# Patient Record
Sex: MALE | Race: BLACK OR AFRICAN AMERICAN | Employment: FULL TIME | URBAN - METROPOLITAN AREA
[De-identification: names, ages, dates, MRNs, and addresses within clinical notes are randomized per-mention and may not be internally consistent; named-entity substitution may affect disease eponyms.]

---

## 2023-03-28 ENCOUNTER — OFFICE VISIT (OUTPATIENT)
Dept: FAMILY MEDICINE | Facility: CLINIC | Age: 48
End: 2023-03-28
Payer: COMMERCIAL

## 2023-03-28 VITALS
RESPIRATION RATE: 24 BRPM | TEMPERATURE: 97.9 F | BODY MASS INDEX: 21.77 KG/M2 | HEART RATE: 60 BPM | SYSTOLIC BLOOD PRESSURE: 135 MMHG | HEIGHT: 71 IN | OXYGEN SATURATION: 100 % | DIASTOLIC BLOOD PRESSURE: 84 MMHG | WEIGHT: 155.5 LBS

## 2023-03-28 DIAGNOSIS — Z12.11 SCREEN FOR COLON CANCER: ICD-10-CM

## 2023-03-28 DIAGNOSIS — K62.5 BRBPR (BRIGHT RED BLOOD PER RECTUM): ICD-10-CM

## 2023-03-28 DIAGNOSIS — Z00.00 HEALTHCARE MAINTENANCE: Primary | ICD-10-CM

## 2023-03-28 DIAGNOSIS — F17.210 CIGARETTE NICOTINE DEPENDENCE WITHOUT COMPLICATION: ICD-10-CM

## 2023-03-28 LAB
CHOLEST SERPL-MCNC: 163 MG/DL
GLUCOSE BLD-MCNC: 74 MG/DL (ref 60–99)
HDLC SERPL-MCNC: 44 MG/DL
LDLC SERPL CALC-MCNC: 108 MG/DL
NONHDLC SERPL-MCNC: 119 MG/DL
TRIGL SERPL-MCNC: 57 MG/DL

## 2023-03-28 PROCEDURE — 90471 IMMUNIZATION ADMIN: CPT | Performed by: FAMILY MEDICINE

## 2023-03-28 PROCEDURE — 90715 TDAP VACCINE 7 YRS/> IM: CPT | Performed by: FAMILY MEDICINE

## 2023-03-28 PROCEDURE — 80061 LIPID PANEL: CPT | Performed by: FAMILY MEDICINE

## 2023-03-28 PROCEDURE — 86803 HEPATITIS C AB TEST: CPT | Performed by: FAMILY MEDICINE

## 2023-03-28 PROCEDURE — 87389 HIV-1 AG W/HIV-1&-2 AB AG IA: CPT | Performed by: FAMILY MEDICINE

## 2023-03-28 PROCEDURE — 36415 COLL VENOUS BLD VENIPUNCTURE: CPT | Performed by: FAMILY MEDICINE

## 2023-03-28 PROCEDURE — 82947 ASSAY GLUCOSE BLOOD QUANT: CPT | Performed by: FAMILY MEDICINE

## 2023-03-28 PROCEDURE — 99386 PREV VISIT NEW AGE 40-64: CPT | Mod: 25 | Performed by: FAMILY MEDICINE

## 2023-03-28 ASSESSMENT — ENCOUNTER SYMPTOMS: HEMATOCHEZIA: 1

## 2023-03-28 NOTE — LETTER
March 30, 2023      Bashir Fang  [address] [place] [address]        Dear ,    We are writing to inform you of your test results.    Tests all look pretty good, Bashir Hall Orders   Lipid Profile   Result Value Ref Range    Cholesterol 163 <200 mg/dL    Triglycerides 57 <150 mg/dL    Direct Measure HDL 44 >=40 mg/dL    LDL Cholesterol Calculated 108 (H) <=100 mg/dL    Non HDL Cholesterol 119 <130 mg/dL    Narrative    Cholesterol  Desirable:  <200 mg/dL    Triglycerides  Normal:  Less than 150 mg/dL  Borderline High:  150-199 mg/dL  High:  200-499 mg/dL  Very High:  Greater than or equal to 500 mg/dL    Direct Measure HDL  Female:  Greater than or equal to 50 mg/dL   Male:  Greater than or equal to 40 mg/dL    LDL Cholesterol  Desirable:  <100mg/dL  Above Desirable:  100-129 mg/dL   Borderline High:  130-159 mg/dL   High:  160-189 mg/dL   Very High:  >= 190 mg/dL    Non HDL Cholesterol  Desirable:  130 mg/dL  Above Desirable:  130-159 mg/dL  Borderline High:  160-189 mg/dL  High:  190-219 mg/dL  Very High:  Greater than or equal to 220 mg/dL   Glucose whole blood   Result Value Ref Range    Glucose Whole Blood 74 60 - 99 mg/dL   Hepatitis C antibody   Result Value Ref Range    Hepatitis C Antibody Nonreactive Nonreactive    Narrative    Assay performance characteristics have not been established for newborns, infants, and children.   HIV Antigen Antibody Combo   Result Value Ref Range    HIV Antigen Antibody Combo Nonreactive Nonreactive      Comment:      HIV-1 p24 Ag & HIV-1/HIV-2 Ab Not Detected       If you have any questions or concerns, please call the clinic at the number listed above.       Sincerely,      Adria Quiñones MD

## 2023-03-28 NOTE — ASSESSMENT & PLAN NOTE
Healthcare maintenance assessment  Immunization-declines COVID (has had initial 2 vaccines), hepatitis B, accepts Tdap  Cancer screening-referred for colonoscopy  Vascular-check lipid, borderline blood pressure deserves following.  Other-HIV hepatitis C, not worried about STDs

## 2023-03-28 NOTE — PROGRESS NOTES
"Adult Male Physical  A/P  Healthcare maintenance  Healthcare maintenance assessment  Immunization-declines COVID (has had initial 2 vaccines), hepatitis B, accepts Tdap  Cancer screening-referred for colonoscopy  Vascular-check lipid, borderline blood pressure deserves following.  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  No new questions  PFSH:  Current medications reviewed as follows:  No current outpatient medications on file prior to visit.  No current facility-administered medications on file prior to visit.     Patient Active Problem List   Diagnosis     Healthcare maintenance     Cigarette nicotine dependence without complication     BRBPR (bright red blood per rectum)     No past medical history on file.  No past surgical history on file.  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    ROS  As per    Objective  Physical Exam  Vitals:    03/28/23 1237   BP: 135/84   BP Location: Left arm   Patient Position: Sitting   Cuff Size: Adult Regular   Pulse: 60   Resp: 24   Temp: 97.9  F (36.6  C)   SpO2: 100%   Weight: 70.5 kg (155 lb 8 oz)   Height: 1.801 m (5' 10.91\")       Gen- alert and oriented x3, no acute distress  HEENT- Normocephalic atraumatic   pupils equal and reactive, EOMI.    TMs visualized " and normal, ear canals normal.    Mouth moist with normal mucosa no ulceration, dentition normal or in good repair.  Neck- supple, no adenopathy or thyromegaly  Chest- Normal chest wall apperance, normal inspiration and expiration.  Clear to asculation.  CV- Regular rate and rhythm, normal tones, no murmus, gallops or rubs.  Abd-  Soft, nodistended, nontender.  Normal bowel sounds, no mass or organ enlargement.    Ext- Atraumatic,  No synovial thickening. Good perfusion, no edema. Periph pulses detected  Skin- warm and dry, no rash  Neuro- Cranial nerves grossly intact.  Normal gait, normal strength.  Reflexes symmetric.  Coordination intact.    Diagnostics  Pending                  Answers for HPI/ROS submitted by the patient on 3/28/2023  Frequency of exercise:: 4-5 days/week  Getting at least 3 servings of Calcium per day:: Yes  Diet:: Regular (no restrictions)  Taking medications regularly:: Yes  Medication side effects:: None  Bi-annual eye exam:: NO  Dental care twice a year:: Yes  Sleep apnea or symptoms of sleep apnea:: None  Blood in stool: Yes  Additional concerns today:: No  Duration of exercise:: Greater than 60 minutes

## 2023-03-29 LAB
HCV AB SERPL QL IA: NONREACTIVE
HIV 1+2 AB+HIV1 P24 AG SERPL QL IA: NONREACTIVE

## 2023-07-25 ENCOUNTER — TRANSFERRED RECORDS (OUTPATIENT)
Dept: HEALTH INFORMATION MANAGEMENT | Facility: CLINIC | Age: 48
End: 2023-07-25
Payer: COMMERCIAL

## 2023-08-02 PROBLEM — D12.6 COLON ADENOMA: Status: ACTIVE | Noted: 2023-08-02

## 2023-12-22 ENCOUNTER — PATIENT OUTREACH (OUTPATIENT)
Dept: GASTROENTEROLOGY | Facility: CLINIC | Age: 48
End: 2023-12-22

## 2024-02-27 ENCOUNTER — PATIENT OUTREACH (OUTPATIENT)
Dept: CARE COORDINATION | Facility: CLINIC | Age: 49
End: 2024-02-27
Payer: COMMERCIAL

## 2024-03-12 ENCOUNTER — PATIENT OUTREACH (OUTPATIENT)
Dept: CARE COORDINATION | Facility: CLINIC | Age: 49
End: 2024-03-12
Payer: COMMERCIAL

## 2024-08-16 ENCOUNTER — NURSE TRIAGE (OUTPATIENT)
Dept: FAMILY MEDICINE | Facility: CLINIC | Age: 49
End: 2024-08-16
Payer: COMMERCIAL

## 2024-08-16 ENCOUNTER — OFFICE VISIT (OUTPATIENT)
Dept: FAMILY MEDICINE | Facility: CLINIC | Age: 49
End: 2024-08-16
Payer: COMMERCIAL

## 2024-08-16 VITALS
TEMPERATURE: 98.4 F | RESPIRATION RATE: 14 BRPM | DIASTOLIC BLOOD PRESSURE: 96 MMHG | OXYGEN SATURATION: 99 % | WEIGHT: 160 LBS | HEART RATE: 54 BPM | SYSTOLIC BLOOD PRESSURE: 154 MMHG | BODY MASS INDEX: 22.37 KG/M2

## 2024-08-16 DIAGNOSIS — H61.22 EXCESSIVE CERUMEN IN LEFT EAR CANAL: Primary | ICD-10-CM

## 2024-08-16 PROCEDURE — 69209 REMOVE IMPACTED EAR WAX UNI: CPT | Mod: LT | Performed by: PHYSICIAN ASSISTANT

## 2024-08-16 NOTE — PROGRESS NOTES
"Assessment & Plan     Excessive cerumen in left ear canal  Resolved following lavage.    Sx resolved.    RTC for persistent or worsening sx.     - REMOVE IMPACTED CERUMEN         Lara Hanna PA-C  [facility]    Yair Camejo is a 49 year old male who presents to clinic today for the following health issues:  Chief Complaint   Patient presents with    Ear Problem     Pressure in left ear since last Wednesday. Has ringing feels clogged and pressure. Cannot lay on left side because it will close.        HPI    Returned from a vacation and noted worked for 2 days, noting acute L ear plugged sensation when laying on the L side.  Opened up temporarily.  Debrox ear wax drops.  Tried seudafed  tried H2O2 in the L ear.  Somewhat better but now can't sleep on that side as it causes plugged sensation.  Ringing sensation.    Occasional \"temperature rise\" due to anxiety and worry of the ear.        Review of Systems  Constitutional, HEENT, cardiovascular, pulmonary, gi and gu systems are negative, except as otherwise noted.      Objective    BP (!) 154/96   Pulse 54   Temp 98.4  F (36.9  C)   Resp 14   Wt 72.6 kg (160 lb)   SpO2 99%   BMI 22.37 kg/m    Physical Exam   Pt is in no acute distress and appears well  Ears patent B:  TM s intact, non-injected. All land marks easily visibile on the R, obstructed with cerumen on the L.  After ear lavage L ear patent, TM intact, noinjected. Sx resolved per pt report.              "

## 2024-08-16 NOTE — TELEPHONE ENCOUNTER
Situation: Left ear pressure, intermittent partial hearing decrease    Background: Was traveling early August and upon returning home noticed he could not hear well out of left ear. Shook his head and regained hearing, but this has recurred intermittently since. Pt endorses he did have new headphones in often on his trip, and this may have contributed, however he is only experiencing unilateral sx.    Assessment: Slight ringing, sometimes sounds are aggravating/irritation  Denies true pain, denies headache other than what is occasionally caused by the anxiety he is experiencing around this issue  Tried sudafed, did a few drops of peroxide in the ear - no relief    Recommendation: Advised  for assessment and evaluation as there are no in-clinic visits available today. Patient is in agreement with this plan.    PHAM BoucherN, RN (she/her)  [facility] RN      Reason for Disposition   Hearing loss in one or both ears of sudden onset and present now   Decreased hearing in 1 ear and gradual onset   Tinnitus (ringing, hissing, beating) and only or mainly in 1 ear    Additional Information   Negative: Followed an ear injury   Negative: Decreased hearing with nasal allergies   Negative: Part of a cold   Negative: Follows air travel or mountain driving   Negative: Earwax, questions about   Negative: Dizziness is main symptom   Negative: Tinnitus (e.g., ringing, hissing, beating) is main symptom   Negative: Ringing in the ears (tinnitus) and taking aspirin and dosage sounds high (i.e., > 1500 mg/day)   Negative: Patient sounds very sick or weak to the triager    Protocols used: Hearing Loss or Change-A-OH

## 2024-09-24 ENCOUNTER — PATIENT OUTREACH (OUTPATIENT)
Dept: CARE COORDINATION | Facility: CLINIC | Age: 49
End: 2024-09-24
Payer: COMMERCIAL

## 2024-10-02 ENCOUNTER — OFFICE VISIT (OUTPATIENT)
Dept: FAMILY MEDICINE | Facility: CLINIC | Age: 49
End: 2024-10-02
Payer: COMMERCIAL

## 2024-10-02 VITALS
WEIGHT: 151 LBS | DIASTOLIC BLOOD PRESSURE: 79 MMHG | RESPIRATION RATE: 16 BRPM | TEMPERATURE: 98 F | HEIGHT: 70 IN | SYSTOLIC BLOOD PRESSURE: 139 MMHG | OXYGEN SATURATION: 99 % | BODY MASS INDEX: 21.62 KG/M2 | HEART RATE: 63 BPM

## 2024-10-02 DIAGNOSIS — Z00.00 HEALTHCARE MAINTENANCE: Primary | ICD-10-CM

## 2024-10-02 DIAGNOSIS — D12.6 COLON ADENOMA: ICD-10-CM

## 2024-10-02 DIAGNOSIS — H00.13 ACUTE CHALAZION OF RIGHT EYE: ICD-10-CM

## 2024-10-02 DIAGNOSIS — F17.210 CIGARETTE NICOTINE DEPENDENCE WITHOUT COMPLICATION: ICD-10-CM

## 2024-10-02 LAB
GLUCOSE BLD-MCNC: 74 MG/DL (ref 60–99)
HBV CORE AB SERPL QL IA: NONREACTIVE
HBV SURFACE AB SERPL IA-ACNC: <3.5 M[IU]/ML
HBV SURFACE AB SERPL IA-ACNC: NONREACTIVE M[IU]/ML
HBV SURFACE AG SERPL QL IA: NONREACTIVE
HIV 1+2 AB+HIV1 P24 AG SERPL QL IA: NONREACTIVE
T PALLIDUM AB SER QL: NONREACTIVE

## 2024-10-02 PROCEDURE — 99212 OFFICE O/P EST SF 10 MIN: CPT | Mod: 25 | Performed by: FAMILY MEDICINE

## 2024-10-02 PROCEDURE — 36415 COLL VENOUS BLD VENIPUNCTURE: CPT | Performed by: FAMILY MEDICINE

## 2024-10-02 PROCEDURE — 87340 HEPATITIS B SURFACE AG IA: CPT | Performed by: FAMILY MEDICINE

## 2024-10-02 PROCEDURE — 90677 PCV20 VACCINE IM: CPT | Performed by: FAMILY MEDICINE

## 2024-10-02 PROCEDURE — 99396 PREV VISIT EST AGE 40-64: CPT | Mod: 25 | Performed by: FAMILY MEDICINE

## 2024-10-02 PROCEDURE — 87491 CHLMYD TRACH DNA AMP PROBE: CPT | Performed by: FAMILY MEDICINE

## 2024-10-02 PROCEDURE — 90656 IIV3 VACC NO PRSV 0.5 ML IM: CPT | Performed by: FAMILY MEDICINE

## 2024-10-02 PROCEDURE — 90472 IMMUNIZATION ADMIN EACH ADD: CPT | Performed by: FAMILY MEDICINE

## 2024-10-02 PROCEDURE — 91320 SARSCV2 VAC 30MCG TRS-SUC IM: CPT | Performed by: FAMILY MEDICINE

## 2024-10-02 PROCEDURE — 90480 ADMN SARSCOV2 VAC 1/ONLY CMP: CPT | Performed by: FAMILY MEDICINE

## 2024-10-02 PROCEDURE — 82947 ASSAY GLUCOSE BLOOD QUANT: CPT | Performed by: FAMILY MEDICINE

## 2024-10-02 PROCEDURE — 87389 HIV-1 AG W/HIV-1&-2 AB AG IA: CPT | Performed by: FAMILY MEDICINE

## 2024-10-02 PROCEDURE — 86780 TREPONEMA PALLIDUM: CPT | Performed by: FAMILY MEDICINE

## 2024-10-02 PROCEDURE — 87591 N.GONORRHOEAE DNA AMP PROB: CPT | Performed by: FAMILY MEDICINE

## 2024-10-02 PROCEDURE — 86706 HEP B SURFACE ANTIBODY: CPT | Performed by: FAMILY MEDICINE

## 2024-10-02 PROCEDURE — 90471 IMMUNIZATION ADMIN: CPT | Performed by: FAMILY MEDICINE

## 2024-10-02 PROCEDURE — 86704 HEP B CORE ANTIBODY TOTAL: CPT | Performed by: FAMILY MEDICINE

## 2024-10-02 SDOH — HEALTH STABILITY: PHYSICAL HEALTH: ON AVERAGE, HOW MANY DAYS PER WEEK DO YOU ENGAGE IN MODERATE TO STRENUOUS EXERCISE (LIKE A BRISK WALK)?: 5 DAYS

## 2024-10-02 ASSESSMENT — SOCIAL DETERMINANTS OF HEALTH (SDOH): HOW OFTEN DO YOU GET TOGETHER WITH FRIENDS OR RELATIVES?: THREE TIMES A WEEK

## 2024-10-02 NOTE — PROGRESS NOTES
Adult Male Physical  A/P  Healthcare maintenance  Healthcare maintenance assessment  Immunization-declines COVID but excepting of pneumococcal and flu vaccines, believes he had hepatitis B vaccines, will test serology.  Cancer screening-history of colon adenoma, plan colonoscopy repeat in 2026  Vascular-lipids excellent last year, recheck glucose, blood pressure acceptable  Other-STD screening      Cigarette nicotine dependence without complication  Precontemplative at this point about quitting      Colon adenoma  Colonoscopy last year, repeat in 2026     Acute chalazion of right eye  4 months, large, pretty impressive, occasionally draining.  Upper lid  Minimal current inflammation, needs to be I indeed, possibly resected, referral to ophthalmology.         HPI  Current Concerns/ Questions  Patient with eye right upper lid.  Tried everything-including hot packing.  Has drained on a couple of occasions but never resolved.  Requesting referral to ophthalmology.  No pain currently.    No other questions.  PFSH:  Current medications reviewed as follows:  No current outpatient medications on file.     No current facility-administered medications for this visit.      Patient Active Problem List   Diagnosis    Healthcare maintenance    Cigarette nicotine dependence without complication    BRBPR (bright red blood per rectum)    Colon adenoma    Acute chalazion of right eye     No past medical history on file.  No past surgical history on file.  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        Objective  Physical Exam  Vitals:    10/02/24 1022   BP: 139/79   BP Location: Left arm   Patient Position: Sitting   Cuff Size: Adult Regular   Pulse: 63   Resp: 16   Temp: 98  F (36.7  C)   TempSrc: Temporal   SpO2: 99%   Weight: 68.5 kg (151 lb)   Height: 1.778 m (5' 10\")       Gen- alert and oriented x3, no acute distress  HEENT- Normocephalic atraumatic   pupils equal and reactive, EOMI.    TMs visualized and normal, ear canals normal.    Mouth moist with normal mucosa no ulceration, dentition normal or in good repair.  Neck- supple, no adenopathy or thyromegaly  Chest- Normal chest wall apperance, normal inspiration and expiration.  Clear to asculation.  CV- Regular rate and rhythm, normal tones, no murmus, gallops or rubs.  Abd-  Soft, nodistended, nontender.  Normal bowel sounds, no mass or organ enlargement.  Genitalia-  was not done  ADRIANA: was not done  Ext- Atraumatic,  No synovial thickening. Good perfusion, no edema. Periph pulses detected  Skin- warm and dry, no rash  Neuro- Cranial nerves grossly intact.  Normal gait, normal strength.  Coordination intact.    Diagnostics  Pending                Screening Questionnaire for Adult Immunization    Are you sick today?   No   Do you have allergies to medications, food, a vaccine component or latex?   No   Have you ever had a serious reaction after receiving a vaccination?   No   Do you have a long-term health problem with heart, lung, kidney, or metabolic disease (e.g., diabetes), asthma, a blood disorder, no spleen, complement component deficiency, a cochlear implant, or a spinal fluid leak?  Are you on long-term aspirin therapy?   No   Do you have cancer, leukemia, HIV/AIDS, or any other immune system problem?   No   Do you have a parent, brother, or sister with an immune system problem?   No   In the past 3 months, " have you taken medications that affect  your immune system, such as prednisone, other steroids, or anticancer drugs; drugs for the treatment of rheumatoid arthritis, Crohn s disease, or psoriasis; or have you had radiation treatments?   No   Have you had a seizure, or a brain or other nervous system problem?   No   During the past year, have you received a transfusion of blood or blood    products, or been given immune (gamma) globulin or antiviral drug?   No   For women: Are you pregnant or is there a chance you could become       pregnant during the next month?   No   Have you received any vaccinations in the past 4 weeks?   No     Immunization questionnaire answers were all negative.      Patient instructed to remain in clinic for 15 minutes afterwards, and to report any adverse reactions.     Screening performed by Rach Penn MA on 10/2/2024 at 10:25 AM.      Signed Electronically by: Adria Quiñones MD

## 2024-10-02 NOTE — ASSESSMENT & PLAN NOTE
4 months, large, pretty impressive, occasionally draining.  Upper lid  Minimal current inflammation, needs to be I indeed, possibly resected, referral to ophthalmology.

## 2024-10-02 NOTE — ASSESSMENT & PLAN NOTE
Healthcare maintenance assessment  Immunization-declines COVID but excepting of pneumococcal and flu vaccines, believes he had hepatitis B vaccines, will test serology.  Cancer screening-history of colon adenoma, plan colonoscopy repeat in 2026  Vascular-lipids excellent last year, recheck glucose, blood pressure acceptable  Other-STD screening

## 2024-10-02 NOTE — LETTER
October 3, 2024      Bashir Fang  [address] [place] [address]        Dear ,    We are writing to inform you of your test results.    Bashir, your recent test results were okay.      Resulted Orders   Hepatitis B Surface Antibody   Result Value Ref Range    Hepatitis B Surface Antibody Nonreactive       Comment:      Nonreactive results, defined as anti-HBs levels of less than 8.5 mIU/mL, indicate a lack of recovery from acute or chronic hepatitis B or inadequate immune response to HBV vaccination.    Hepatitis B Surface Antibody Instrument Value <3.50 <8.5 m[IU]/mL   Hepatitis B core antibody   Result Value Ref Range    Hepatitis B Core Antibody Total Nonreactive Nonreactive      Comment:      Nonreactive hepatitis B core antibody test results indicate the absence of exposure to hepatitis B virus and no evidence of recent, past/resolved, or chronic hepatitis B.    Hepatitis B surface antigen   Result Value Ref Range    Hepatitis B Surface Antigen Nonreactive Nonreactive   HIV Antigen Antibody Combo Cascade   Result Value Ref Range    HIV Antigen Antibody Combo Nonreactive Nonreactive      Comment:      Negative HIV-1 p24 antigen and HIV-1/2 antibody screening test results usually indicate the absence of HIV-1 and HIV-2 infection. However, such negative results do not rule-out acute HIV infection.  If acute HIV-1 or HIV-2 infection is suspected, detection of HIV-1 or HIV-2 RNA  is recommended.    Treponema Abs w Reflex to RPR and Titer   Result Value Ref Range    Treponema Antibody Total Nonreactive Nonreactive   Glucose, whole blood   Result Value Ref Range    Glucose Whole Blood 74 60 - 99 mg/dL   Chlamydia trachomatis/Neisseria gonorrhoeae by PCR   Result Value Ref Range    Chlamydia Trachomatis Negative Negative      Comment:      Negative for C. trachomatis rRNA by transcription mediated amplification.   A negative result by transcription mediated amplification does not preclude the  presence of infection because results are dependent on proper and adequate collection, absence of inhibitors and sufficient rRNA to be detected.    Neisseria gonorrhoeae Negative Negative      Comment:      Negative for N. gonorrhoeae rRNA by transcription mediated amplification. A negative result by transcription mediated amplification does not preclude the presence of C. trachomatis infection because results are dependent on proper and adequate collection, absence of inhibitors and sufficient rRNA to be detected.       If you have any questions or concerns, please call the clinic at the number listed above.       Sincerely,      Adria Quiñones MD

## 2024-10-03 LAB
C TRACH DNA SPEC QL PROBE+SIG AMP: NEGATIVE
N GONORRHOEA DNA SPEC QL NAA+PROBE: NEGATIVE

## 2025-09-03 ENCOUNTER — PATIENT OUTREACH (OUTPATIENT)
Dept: CARE COORDINATION | Facility: CLINIC | Age: 50
End: 2025-09-03
Payer: COMMERCIAL